# Patient Record
Sex: MALE | Race: WHITE | Employment: UNEMPLOYED | URBAN - METROPOLITAN AREA
[De-identification: names, ages, dates, MRNs, and addresses within clinical notes are randomized per-mention and may not be internally consistent; named-entity substitution may affect disease eponyms.]

---

## 2018-06-18 ENCOUNTER — HOSPITAL ENCOUNTER (OUTPATIENT)
Dept: PHYSICAL THERAPY | Age: 58
End: 2018-06-18

## 2018-07-02 ENCOUNTER — HOSPITAL ENCOUNTER (OUTPATIENT)
Dept: PHYSICAL THERAPY | Age: 58
Discharge: HOME | End: 2018-07-02
Payer: COMMERCIAL

## 2018-07-02 PROCEDURE — 97140 MANUAL THERAPY 1/> REGIONS: CPT

## 2018-07-02 PROCEDURE — 97162 PT EVAL MOD COMPLEX 30 MIN: CPT

## 2018-07-02 NOTE — PROGRESS NOTES
PHYSICAL THERAPY - DAILY TREATMENT NOTE    Patient Name: Luca Kyle        Date: 2018  : 1960   YES Patient  Verified  Visit #:     Insurance: Payor: Kehinde Paniagua / Plan: Tray Kuhn / Product Type: HMO /      In time: 4:00 Out time: 4:50   Total Treatment Time: 50     Medicare Time Tracking (below)   Total Timed Codes (min):  NA 1:1 Treatment Time:  NA     TREATMENT AREA =  Left TKA    SUBJECTIVE    Pain Level (on 0 to 10 scale):  4  / 10   Medication Changes/New allergies or changes in medical history, any new surgeries or procedures? NO    If yes, update Summary List   Subjective Functional Status/Changes:  []  No changes reported     States he had a left TKR \"on or around\" 2018. States he went home after the surgery and had 2 weeks of home health PT. States he has continued with the HEP given to him from the home health PT (standing heel raise, stand march, stair, step ups, LAQ, seated marching)  Lives with his wife in a trilevel home with BR up 1 flight of steps. States he is negotiating steps with 1 HR and nonreciprocal gait pattern. States he uses a SPC ~ 75% of the time. States 25% of the time he is able to walk without an assistive device.   States the pain is intermittent           OBJECTIVE  Physical Therapy Evaluation - Knee        Gait:  [] Normal    [x] Abnormal    [] Antalgic    [] NWB    Device:none    Describe:decreased gait speed, decreased heel strike on left LE, decreased toe off, decreased left knee flexion during swing phase, decreased stance time on left LE   ROM / Strength  [] Unable to assess                  AROM                      PROM                   Strength (1-5)    Left Right Left Right Left Right   Hip Flexion     5 5    Extension     4- 4-    Abduction     5 5    Adduction     4 4   Knee Flexion 115  120  4- 5    Extension 5  2  5 5   Ankle Plantarflexion          Dorsiflexion             Flexibility: [] Unable to assess at this time  Hamstrings:    (L) Tightness= [] WNL   [x] Min   [x] Mod   [] Severe    (R) Tightness= [] WNL   [x] Min   [x] Mod   [] Severe  Quadriceps:    (L) Tightness= [] WNL   [] Min   [] Mod   [] Severe    (R) Tightness= [] WNL   [] Min   [] Mod   [] Severe  Gastroc:      (L) Tightness= [] WNL   [] Min   [] Mod   [] Severe    (R) Tightness= [] WNL   [] Min   [] Mod   [] Severe  Other:    Palpation:   Neg/Pos  Neg/Pos  Neg/Pos   Joint Line  Quad tendon  Patellar ligament    Patella + Fibular head  Pes Anserinus    Tibial tubercle  Hamstring tendons  Infrapatellar fat pad      Optional Tests:  Patellar Positioning (Static)   []L []R Normal []L []R Lateral   []L []R Trena Hoof      []L []R Medial   []L []R [place]    Patellar Tracking   []L []R Glide (Lat)   []L []R Tilt (Lat)     []L []R Glide (Med)  []L []R Tilt (Med)      []L []R Tile (Inf)     Patellar Mobility   []L []R Hypermobile []L [x]R Hypomobile         Girth Measurements:     Cm at  Cm above joint line   Cm at   Cm below joint line  Cm at joint line   Left        Right           Lachmans  [] Neg    [] Pos Posterior Drawer [] Neg    [] Pos  Pivot Shift  [] Neg    [] Pos Posterior Sag  [] Neg    [] Pos  DAYDAY   [] Neg    [] Pos Keri's Test [] Neg    [] Pos  ALRI   [] Neg    [] Pos Squat   [] Neg    [] Pos  Valgus@ 0 Degrees [] Neg    [] Pos Cielo-Gigi [] Neg    [] Pos  Valgus@ 30 Degrees [] Neg    [] Pos Patellar Apprehension [] Neg    [] Pos  Varus@ 0 Degrees [] Neg    [] Pos Leon's Compression [] Neg    [] Pos  Varus@ 30 Degrees [] Neg    [] Pos Ely's Test  [] Neg    [] Pos  Apley's Compression [] Neg    [] Pos Van's Test  [] Neg    [] Pos  Apley's Distraction [] Neg    [] Pos Stroke Test  [] Neg    [] Pos   Anterior Drawer [] Neg    [] Pos Fluctuation Test [] Neg    [] Pos  Other:                  [] Neg    [] Pos                 Other tests/comments:    Modalities Rationale:    decrease edema, decrease inflammation and decrease pain to improve patient's ability to perform ADL's, gait, and functional mobility with improved joint mechanics and decreased pain. min [] Estim, type/location:                                      []  att     []  unatt     []  w/US     []  w/ice    []  w/heat    min []  Mechanical Traction: type/lbs                   []  pro   []  sup   []  int   []  cont    []  before manual    []  after manual    min []  Ultrasound, settings/location:      min []  Iontophoresis w/ dexamethasone, location:                                               []  take home patch       []  in clinic   10 min [x]  Ice     []  Heat    location/position: supine    min []  Vasopneumatic Device, press/temp:     min []  Other:    [x] Skin assessment post-treatment (if applicable):    [x]  intact    []  redness- no adverse reaction     []redness  adverse reaction:      8 min Manual Therapy: Patellar mobs, scar massage, PROM to left knee   Rationale:      decrease pain, increase ROM and increase tissue extensibility to improve patient's ability to perform ADL's, gait, and functional mobility with improved joint mechanics and decreased pain.      min Patient Education:  YES  Reviewed HEP   []  Progressed/Changed HEP based on:   Cont current HEP     Other Objective/Functional Measures:    See eval     Post Treatment Pain Level (on 0 to 10) scale:   4  / 10     ASSESSMENT  Justification for Eval Code Complexity:  Patient History (low 0, mod 1-2, high 3-4): high (arthritis, back pain, DM)  Examination (low 1-2, mod 3+, high 4+): mod (see above)  Clinical Presentation (low stable or uncomplicated, mod evolving or changing, high unstable or unpredictable): mod  Clinical Decision Making (low , mod 26-74, high 1-25): FOTO = 32/100 mod   []  See Progress Note/Recertification   Patient will continue to benefit from skilled PT services to modify and progress therapeutic interventions, address functional mobility deficits, address ROM deficits, address strength deficits, analyze and address soft tissue restrictions, analyze and cue movement patterns, analyze and modify body mechanics/ergonomics, assess and modify postural abnormalities and instruct in home and community integration to attain remaining goals. Progress toward goals / Updated goals:    Goals established. PLAN    [x]  Upgrade activities as tolerated YES Continue plan of care   []  Discharge due to :    []  Other:      Therapist: Jessica Durant PT    Date: 7/2/2018 Time: 4:05 PM     No future appointments.

## 2018-07-02 NOTE — PROGRESS NOTES
[address] [facility] PHYSICAL THERAPY  [address] [facility] Ernestine Santana, [address] - Phone: [phone]  Fax: [phone] / [address] [facility]  Patient Name: Nely Santoyo : 1960   Medical   Diagnosis: Left knee pain [M25.562] Treatment Diagnosis: Left TKA   Onset Date: 2018     Referral Source: Romelia Macdonald [place] [facility]): 2018   Prior Hospitalization: See medical history Provider #: [id number]   Prior Level of Function: Chronic left knee pain, independent    Comorbidities: Arthritis, DM, back pain   Medications: Verified on Patient Summary List   The Plan of Care and following information is based on the information from the initial evaluation.   ===========================================================================================  Assessment / key information:  Patient is a 59y.o. year old male who presents to [facility] s/p left TKR with DOS 2018. Patient reports he has had 2 weeks of home health physical therapy. Patient is currenty ambulating with a SPC intermittently. He presents to PT today without an assistive device. ROM and strength measurements are documented below. Medial/lateral mobility is WNL; inferior/superior patellar mobility is mildly decreased in the left LE. Patient with a Functional Status score of 32/100 on FOTO (Focused on Therapeutic Outcomes), which corresponds to a functional limitation of 68%. Patient with gait deviations as follows: decreased gait speed, decreased heel strike on left LE, decreased toe off, decreased left knee flexion during swing phase, decreased stance time on left LE. Patient will benefit from skilled physical therapy services to address these issues.     ROM / Strength  [] Unable to assess                  AROM                      PROM                   Strength (1-5)      Left Right Left Right Left Right   Hip Flexion         5 5     Extension         4- 4-     Abduction         5 5     Adduction         4 4   Knee Flexion 115   120   4- 5     Extension 5   2   5 5       ===========================================================================================  Eval Complexity: History: HIGH Complexity :3+ comorbidities / personal factors will impact the outcome/ POC Exam:MEDIUM Complexity : 3 Standardized tests and measures addressing body structure, function, activity limitation and / or participation in recreation  Presentation: MEDIUM Complexity : Evolving with changing characteristics  Clinical Decision Making:MEDIUM Complexity : FOTO score of 26-74Overall Complexity:MEDIUM    Problem List: pain affecting function, decrease ROM, decrease strength, edema affecting function, impaired gait/ balance, decrease ADL/ functional abilitiies, decrease activity tolerance, decrease flexibility/ joint mobility and decrease transfer abilities   Treatment Plan may include any combination of the following: Therapeutic exercise, Therapeutic activities, Neuromuscular re-education, Physical agent/modality, Gait/balance training, Manual therapy, Aquatic therapy and Patient education  Patient / Family readiness to learn indicated by: asking questions, trying to perform skills and interest  Persons(s) to be included in education: patient (P)  Barriers to Learning/Limitations: None  Measures taken:    Patient Goal (s): \"healing\"   Patient self reported health status: fair  Rehabilitation Potential: good   Short Term Goals: To be accomplished in  2  weeks:  1) Patient will be compliant with HEP.  Long Term Goals: To be accomplished in  4  weeks:  1) Patient to be independent & compliant with HEP in preparation for D/C.  2) Patient will improve left knee AROM flexion to >120 degrees to increase ease with ADL's.    3) Improve strength of left knee flexion to 5/5 with no c/o pain to increase dynamic stability with gait.   4) Patient will improve left knee AROM extension to < 0 degrees to facilitate normal gait. 5)  Patient will increase FOTO Functional Status score to 54/100 to indicate decreased functional limitations. Frequency / Duration:   Patient to be seen  2-3  times per week for 4  weeks:  Patient / Caregiver education and instruction: self care  G-Codes (GP): VADIM  Therapist Signature: Cori Keita PT Date: 6/7/6074   Certification Period: NA Time: 5:04 PM   ===========================================================================================  I certify that the above Physical Therapy Services are being furnished while the patient is under my care. I agree with the treatment plan and certify that this therapy is necessary. Physician Signature:        Date:       Time:     Please sign and return to [facility] or you may fax the signed copy to [phone]. Thank you.

## 2018-07-06 ENCOUNTER — HOSPITAL ENCOUNTER (OUTPATIENT)
Dept: PHYSICAL THERAPY | Age: 58
Discharge: HOME | End: 2018-07-06
Payer: COMMERCIAL

## 2018-07-06 PROCEDURE — 97140 MANUAL THERAPY 1/> REGIONS: CPT

## 2018-07-06 PROCEDURE — 97110 THERAPEUTIC EXERCISES: CPT

## 2018-07-06 NOTE — PROGRESS NOTES
PHYSICAL THERAPY - DAILY TREATMENT NOTE    Patient Name: Heraclio Gomes        Date: 2018  : 1960   YES Patient  Verified  Visit #:   2   of   12  Insurance: Payor: Andrea Richter / Plan: Valri Pulling / Product Type: HMO /      In time: 8:00 Out time: 8:53   Total Treatment Time: 53     Medicare/[facility] Time Tracking (below)   Total Timed Codes (min):  48 1:1 Treatment Time:  48     TREATMENT AREA =  Left TKA    SUBJECTIVE    Pain Level (on 0 to 10 scale):  3  / 10   Medication Changes/New allergies or changes in medical history, any new surgeries or procedures? NO    If yes, update Summary List   Subjective Functional Status/Changes:  []  No changes reported     Patient reports he has been compliant with his HEP but hasnt seen much improvement. OBJECTIVE    Modalities Rationale:    decrease edema, decrease inflammation and decrease pain to improve patient's ability to perform perform ADL's and age appropriate recreational activities. min [] Estim, type/location:                                      []  att     []  unatt     []  w/US     []  w/ice    []  w/heat    min []  Mechanical Traction: type/lbs                   []  pro   []  sup   []  int   []  cont    []  before manual    []  after manual    min []  Ultrasound, settings/location:      min []  Iontophoresis w/ dexamethasone, location:                                               []  take home patch-6hour remove at        []  in clinic   5 min [x]  Ice     []  Heat    location/position: Left Knee    min []  Vasopneumatic Device, press/temp:     min []  Other:    [x] Skin assessment post-treatment (if applicable):    [x]  intact    []  redness- no adverse reaction     []redness  adverse reaction:      40 min Therapeutic Exercise:  [x]  See flow sheet   Rationale:      increase ROM and increase strength to improve the patients ability to negotiate various environments in a safe and effective manner.     Rationale:     8 min Manual Therapy: Patellar Mobs in all directions in Grades 1-2  Scar Massage   Rationale:      decrease pain, increase ROM and increase tissue extensibility to improve patient's pain-free mobility     min Patient Education:  YES  Reviewed HEP and education regaridng scar massage provided   []  Progressed/Changed HEP based on: Other Objective/Functional Measures:    Increased Temperature, Rubor and Swelling in Left knee  Grade 1-2 Patellar Mobs not tolerated well. Decreased inf/sup movement       Post Treatment Pain Level (on 0 to 10) scale:   7  / 10     ASSESSMENT    Assessment/Changes in Function:     Initiated POC. Today manual treatment was not tolerated well. Inferior and superior patellar movement was very limited. []  See Progress Note/Recertification   Patient will continue to benefit from skilled PT services to analyze,, cue,, progress,, modify,, demonstrate,, instruct, and address, movement patterns,, therapeutic interventions,, postural abnormalities,, soft tissue restrictions,, ROM,, strength,, functional mobility,, body mechanics/ergonomics, and home and community integration, to attain remaining goals.    Progress toward goals / Updated goals:    Initiated POC     PLAN    []  Upgrade activities as tolerated YES Continue plan of care   []  Discharge due to :    []  Other:      Therapist: Andrea Saldivar DPT    Date: 7/6/2018 Time: 7:31 AM   Future Appointments  Date Time Provider Theron Lam   7/6/2018 8:00 AM [facility] [facility]   7/9/2018 2:30 PM [facility] [facility]   7/11/2018 11:00 AM [facility]   7/16/2018 3:00 PM [facility]   7/18/2018 11:00 AM [facility]   7/25/2018 11:00 AM [facility]   7/27/2018 9:00 AM [facility]   7/30/2018 10:00 AM [facility]

## 2018-07-09 ENCOUNTER — HOSPITAL ENCOUNTER (OUTPATIENT)
Dept: PHYSICAL THERAPY | Age: 58
Discharge: HOME | End: 2018-07-09
Payer: COMMERCIAL

## 2018-07-09 PROCEDURE — 97016 VASOPNEUMATIC DEVICE THERAPY: CPT

## 2018-07-09 PROCEDURE — 97110 THERAPEUTIC EXERCISES: CPT

## 2018-07-09 PROCEDURE — 97140 MANUAL THERAPY 1/> REGIONS: CPT

## 2018-07-09 NOTE — PROGRESS NOTES
PHYSICAL THERAPY - DAILY TREATMENT NOTE    Patient Name: Kelsi Wray        Date: 2018  : 1960   YES Patient  Verified  Visit #:   3   of   12  Insurance: Payor: Darryle Blander / Plan: Sharee Agarwal / Product Type: HMO /      In time: 2:51 Out time: 3:47   Total Treatment Time: 56     Medicare/[employer] Time Tracking (below)   Total Timed Codes (min):  56 1:1 Treatment Time:  56     TREATMENT AREA =  Left TKA    SUBJECTIVE    Pain Level (on 0 to 10 scale):  0  / 10   Medication Changes/New allergies or changes in medical history, any new surgeries or procedures? NO    If yes, update Summary List   Subjective Functional Status/Changes:  []  No changes reported     Patient reports to clinic 21 minutes late but no pain today. He states he took some advil this morning but his back pain has been increased. Doesn't know exactly what caused the back pain. OBJECTIVE    Modalities Rationale:    decrease inflammation and decrease pain to improve patient's ability to perform perform ADL's and age appropriate recreational activities.    min [] Estim, type/location:                                      []  att     []  unatt     []  w/US     []  w/ice    []  w/heat    min []  Mechanical Traction: type/lbs                   []  pro   []  sup   []  int   []  cont    []  before manual    []  after manual    min []  Ultrasound, settings/location:      min []  Iontophoresis w/ dexamethasone, location:                                               []  take home patch-6hour remove at        []  in clinic    min []  Ice     []  Heat    location/position:    10 min [x]  Vasopneumatic Device, press/temp: Left Knee    min []  Other:    [x] Skin assessment post-treatment (if applicable):    [x]  intact    []  redness- no adverse reaction     []redness  adverse reaction:      38 min Therapeutic Exercise:  [x]  See flow sheet   Rationale:      increase ROM and increase strength to improve the patients ability to negotiate various environments in a safe and effective manner. 8 min Manual Therapy: Scar Massage, Grade 2-3 patella mobs in all directions   Rationale:      increase ROM and increase tissue extensibility to improve patient's pain-free mobility     min Patient Education:  YES  Reviewed HEP   []  Progressed/Changed HEP based on: Other Objective/Functional Measures:  21 minutes late  Observation: wearing two different types of shoes to accomodate LE length discrepancy. Reports improved symptoms management. Increased pain with weightbearing with hip 3 way. Post Treatment Pain Level (on 0 to 10) scale:   2  / 10     ASSESSMENT    Assessment/Changes in Function:     Patient tolerated treatment session well and is progressing well towards goals. Patient experienced 10/10 pain when trying to transfer from supine to right sidelying after straight leg raises. After modalities, patient's pain was 2/10 and he was able to walk out of clinic without any issues. []  See Progress Note/Recertification   Patient will continue to benefit from skilled PT services to analyze,, cue,, progress,, modify,, demonstrate,, instruct, and address, movement patterns,, therapeutic interventions,, postural abnormalities,, soft tissue restrictions,, ROM,, strength,, functional mobility,, body mechanics/ergonomics, and home and community integration, to attain remaining goals. Progress toward goals / Updated goals: · Short Term Goals: To be accomplished in  2  weeks:  1) Patient will be compliant with HEP.    · Long Term Goals: To be accomplished in  4  weeks:  1) Patient to be independent & compliant with HEP in preparation for D/C.  2) Patient will improve left knee AROM flexion to >120 degrees to increase ease with ADL's.    3) Improve strength of left knee flexion to 5/5 with no c/o pain to increase dynamic stability with gait.   4) Patient will improve left knee AROM extension to < 0 degrees to facilitate normal gait.  5)  Patient will increase FOTO Functional Status score to 54/100 to indicate decreased functional limitations.     Progression towards goals limited by tardiness to session     PLAN    []  Upgrade activities as tolerated YES Continue plan of care   []  Discharge due to :    []  Other:      Therapist: Evelyn Styles DPT    Date: 7/9/2018 Time: 2:26 PM   Future Appointments  Date Time Provider Theron Lam   7/9/2018 2:30 PM [facility] [facility] [address]   7/11/2018 11:00 AM [facility]   7/16/2018 3:00 PM [facility] [facility] [address]   7/18/2018 11:00 AM [facility]   7/25/2018 11:00 AM [facility]   7/27/2018 9:00 AM [facility]   7/30/2018 10:00 AM [facility]

## 2018-07-11 ENCOUNTER — HOSPITAL ENCOUNTER (OUTPATIENT)
Dept: PHYSICAL THERAPY | Age: 58
Discharge: HOME | End: 2018-07-11
Payer: COMMERCIAL

## 2018-07-11 PROCEDURE — 97110 THERAPEUTIC EXERCISES: CPT

## 2018-07-11 PROCEDURE — 97140 MANUAL THERAPY 1/> REGIONS: CPT
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
## 2018-07-16 ENCOUNTER — HOSPITAL ENCOUNTER (OUTPATIENT)
Dept: PHYSICAL THERAPY | Age: 58
End: 2018-07-16
Payer: COMMERCIAL

## 2018-07-18 ENCOUNTER — HOSPITAL ENCOUNTER (OUTPATIENT)
Dept: PHYSICAL THERAPY | Age: 58
Discharge: HOME | End: 2018-07-18
Payer: COMMERCIAL

## 2018-07-18 PROCEDURE — 97140 MANUAL THERAPY 1/> REGIONS: CPT

## 2018-07-18 PROCEDURE — 97110 THERAPEUTIC EXERCISES: CPT

## 2018-07-18 NOTE — PROGRESS NOTES
PHYSICAL THERAPY - DAILY TREATMENT NOTE    Patient Name: Jeannette Brandon        Date: 2018  : 1960   YES Patient  Verified  Visit #:     Insurance: Payor: Claudeen Lao / Plan: Klaudia Sanderson / Product Type: HMO /      In time: 11:07 Out time: 12:00   Total Treatment Time: 53     Medicare/BCBS [place] Time Tracking (below)   Total Timed Codes (min):  NA 1:1 Treatment Time:  NA     TREATMENT AREA =  Left TKA    SUBJECTIVE    Pain Level (on 0 to 10 scale):  3  / 10   Medication Changes/New allergies or changes in medical history, any new surgeries or procedures? NO    If yes, update Summary List   Subjective Functional Status/Changes:  []  No changes reported     \"It is getting a little better. I just feel like I should be running around by now. \"     Patient reported at end of therapy that sometimes he has increased pain after therapy and has to sit down for the rest of the day. OBJECTIVE    Modalities Rationale:  decrease edema, decrease inflammation and decrease pain to improve patient's ability to perform ADL's, gait, and functional mobility with improved joint mechanics and decreased pain.      min [] Estim, type/location:                                      []  att     []  unatt     []  w/US     []  w/ice    []  w/heat    min []  Mechanical Traction: type/lbs                   []  pro   []  sup   []  int   []  cont    []  before manual    []  after manual    min []  Ultrasound, settings/location:      min []  Iontophoresis w/ dexamethasone, location:                                               []  take home patch       []  in clinic   10 min [x]  Ice     []  Heat    location/position: supine    min []  Vasopneumatic Device, press/temp:     min []  Other:    [x] Skin assessment post-treatment (if applicable):    [x]  intact    []  redness- no adverse reaction     []redness  adverse reaction:      35 min Therapeutic Exercise:  [x]  See flow sheet   Rationale:      increase ROM and increase strength to improve the patients ability to perform ADL's, gait, and functional mobility with improved joint mechanics and decreased pain. 8 min Manual Therapy: Scar massage, patellar mobs, PROM for left knee flexion/extension   Rationale:      decrease pain, increase ROM and increase tissue extensibility to improve patient's ability to perform ADL's, gait, and functional mobility with improved joint mechanics and decreased pain. min Patient Education:  YES  Reviewed HEP   []  Progressed/Changed HEP based on:   Cont HEP     Other Objective/Functional Measures:    Left knee AROM: 2-115; PROM flexion to 120 deg. Patient reported back pain with minisquats. Educated patient on proper body mechanics and patient performed hip hinge (sit <> stand). He then performed minisquats with hip hinge technique and reported no back pain. Post Treatment Pain Level (on 0 to 10) scale:   6  / 10     ASSESSMENT    Assessment/Changes in Function:     Patient performed exercises with minimal c/o's pain; however, after cold pack, patient reported increased pain in left knee. []  See Progress Note/Recertification   Patient will continue to benefit from skilled PT services to modify and progress therapeutic interventions, address functional mobility deficits, address ROM deficits, address strength deficits, analyze and address soft tissue restrictions, analyze and cue movement patterns, analyze and modify body mechanics/ergonomics, assess and modify postural abnormalities and instruct in home and community integration to attain remaining goals. Progress toward goals / Updated goals: · Short Term Goals: To be accomplished in  2  weeks:  1) Patient will be compliant with HEP. Reports compliance with HEP.      PLAN    [x]  Upgrade activities as tolerated YES Continue plan of care   []  Discharge due to :    []  Other:      Therapist: Irvin Mai PT    Date: 7/18/2018 Time: 11:25 AM     Future Appointments  Date Time Provider Theron Carole   7/25/2018 11:00 AM Meg Tilley, PT [facility]   7/27/2018 9:00 AM Meg Tilley, PT [facility]   7/30/2018 10:00 AM Meg Tilley, PT [facility]

## 2018-07-25 ENCOUNTER — HOSPITAL ENCOUNTER (OUTPATIENT)
Dept: PHYSICAL THERAPY | Age: 58
Discharge: HOME | End: 2018-07-25
Payer: COMMERCIAL

## 2018-07-25 PROCEDURE — 97140 MANUAL THERAPY 1/> REGIONS: CPT

## 2018-07-25 NOTE — PROGRESS NOTES
PHYSICAL THERAPY - DAILY TREATMENT NOTE    Patient Name: Thomas Blevins        Date: 2018  : 1960   YES Patient  Verified  Visit #:     Insurance: Payor: Tanner Loomis / Plan: Satinder Mendoza / Product Type: HMO /      In time: 11:08 Out time: 11:58   Total Treatment Time: 50     Medicare/BCBS [place] Time Tracking (below)   Total Timed Codes (min):  40 1:1 Treatment Time:  12     TREATMENT AREA =  Left TKA    SUBJECTIVE    Pain Level (on 0 to 10 scale):  3-4  / 10   Medication Changes/New allergies or changes in medical history, any new surgeries or procedures? NO    If yes, update Summary List   Subjective Functional Status/Changes:  []  No changes reported     \"My knee hurt for 2-3 days after the last PT session. It felt better on  and then I went and walked around the [employer] for 45 minutes on Monday. That made my knee hurt again. I don't know why but it hurts so bad. \"  Reports he on takes Tramadol \"sometimes\" before bed. Patient reports compliance with previous HEP of LAQ, standing heel raise, stand march, knee flexion stretch at step, and marching. OBJECTIVE    Modalities Rationale:  decrease edema, decrease inflammation and decrease pain to improve patient's ability to perform ADL's, gait, and functional mobility with improved joint mechanics and decreased pain.      min [] Estim, type/location:                                      []  att     []  unatt     []  w/US     []  w/ice    []  w/heat    min []  Mechanical Traction: type/lbs                   []  pro   []  sup   []  int   []  cont    []  before manual    []  after manual    min []  Ultrasound, settings/location:      min []  Iontophoresis w/ dexamethasone, location:                                               []  take home patch       []  in clinic   10 min [x]  Ice     []  Heat    location/position: Long sitting    min []  Vasopneumatic Device, press/temp:     min []  Other:    [x] Skin assessment post-treatment (if applicable):    [x]  intact    []  redness- no adverse reaction     []redness  adverse reaction:      28 (0) min Therapeutic Exercise:  [x]  See flow sheet   Rationale:      increase ROM and increase strength to improve the patients ability to perform ADL's, gait, and functional mobility with improved joint mechanics and decreased pain. 12 min Manual Therapy: Scar massage, patellar mobs (inf/sup/med/lat), PROM to knee flex/ext. Rationale:      decrease pain, increase ROM and increase tissue extensibility to improve patient's ability to perform ADL's, gait, and functional mobility with improved joint mechanics and decreased pain. min Patient Education:  YES  Reviewed HEP   []  Progressed/Changed HEP based on: Other Objective/Functional Measures:    Discussed using OTC pain med to help control pain. Patient stated that he used to take Advil \"all the time\" but he has not tried it since the surgery. Reported he would try taking Advil to see if it helps pain. No discoloration or signs of infection in the left knee noted. Patient denies tenderness with palpation over the left ITB. Held more strenuous exercises today due to history of increased pain for days after therapy. Left knee AROM: 2 - 118 degrees. Post Treatment Pain Level (on 0 to 10) scale:   2  / 10     ASSESSMENT    Assessment/Changes in Function:     Fair tolerance with exercises. []  See Progress Note/Recertification   Patient will continue to benefit from skilled PT services to modify and progress therapeutic interventions, address functional mobility deficits, address ROM deficits, address strength deficits, analyze and address soft tissue restrictions, analyze and cue movement patterns, analyze and modify body mechanics/ergonomics, assess and modify postural abnormalities and instruct in home and community integration to attain remaining goals.      Progress toward goals / Updated goals:    · Long Term Goals: To be accomplished in  4  weeks:  1) Patient to be independent & compliant with HEP in preparation for D/C. Compliant with HEP  2) Patient will improve left knee AROM flexion to >120 degrees to increase ease with ADL's. Left knee flexion AROM to 118 degrees. 3) Improve strength of left knee flexion to 5/5 with no c/o pain to increase dynamic stability with gait. 4) Patient will improve left knee AROM extension to < 0 degrees to facilitate normal gait. Left knee AROM ext to 2 degrees. 5)  Patient will increase FOTO Functional Status score to 54/100 to indicate decreased functional limitations.      PLAN    [x]  Upgrade activities as tolerated YES Continue plan of care   []  Discharge due to :    []  Other:      Therapist: Mark Evans PT    Date: 7/25/2018 Time: 8:03 AM     Future Appointments  Date Time Provider Theron Lam   7/25/2018 11:00 AM Kirt Gunn PT [facility]   7/27/2018 9:00 AM Mark Evans PT [facility]   7/30/2018 10:00 AM Mark Evans PT [facility]   8/1/2018 8:00 AM Mark Evans PT [facility]   8/6/2018 5:00 PM Mark Evans PT [facility]   8/8/2018 1:00 PM Mark Evans PT [facility]   8/13/2018 3:00 PM Mark Evans PT [facility]   8/17/2018 1:00 PM Mark Evans PT [facility]   8/24/2018 11:00 AM Mark Evans PT [facility]   8/27/2018 5:00 PM Mark Evans PT [facility]   8/29/2018 1:00 PM Mark Evans PT [facility]

## 2018-07-27 ENCOUNTER — HOSPITAL ENCOUNTER (OUTPATIENT)
Dept: PHYSICAL THERAPY | Age: 58
Discharge: HOME | End: 2018-07-27
Payer: COMMERCIAL

## 2018-07-27 PROCEDURE — 97110 THERAPEUTIC EXERCISES: CPT

## 2018-07-27 PROCEDURE — 97140 MANUAL THERAPY 1/> REGIONS: CPT

## 2018-07-27 PROCEDURE — 97016 VASOPNEUMATIC DEVICE THERAPY: CPT

## 2018-07-27 NOTE — PROGRESS NOTES
PHYSICAL THERAPY - DAILY TREATMENT NOTE Patient Name: Sarika Hays        Date: 2018 : 1960   YES Patient  Verified Visit #:     Insurance: Payor: Terrence Romero / Plan: Isabella Coles / Product Type: HMO /   
 
In time: 584 Out time:  Total Treatment Time: 60 Medicare/[place] or [place] Time Tracking (below) Total Timed Codes (min):  60 1:1 Treatment Time:  60 TREATMENT AREA =  LEFT TKA SUBJECTIVE Pain Level (on 0 to 10 scale):   / 10 Medication Changes/New allergies or changes in medical history, any new surgeries or procedures? NO    If yes, update Summary List  
Subjective Functional Status/Changes:  []  No changes reported Pt reports that his knees are feeling much better today. OBJECTIVE Modalities Rationale:    decrease edema, decrease inflammation and decrease pain to improve patient's ability to perform perform ADL's and age appropriate recreational activities. min [] Estim, type/location:    
                                 []  att     []  unatt     []  w/US     []  w/ice    []  w/heat  
 min []  Mechanical Traction: type/lbs   
               []  pro   []  sup   []  int   []  cont    []  before manual    []  after manual  
 min []  Ultrasound, settings/location:    
 min []  Iontophoresis w/ dexamethasone, location:   
                                           []  take home patch-6hour remove at        []  in clinic  
 min []  Ice     []  Heat    location/position:   
10 min [x]  Vasopneumatic Device, press/temp: LEft Knee  
 min []  Other:   
[x] Skin assessment post-treatment (if applicable):   
[x]  intact    []  redness- no adverse reaction    
[]redness  adverse reaction:   
 
38 min Therapeutic Exercise:  [x]  See flow sheet Rationale:      increase ROM and increase strength to improve the patients ability to negotiate various environments in a safe and effective manner.  
 
12 min Manual Therapy: Scar massage, patellar mobs (inf/sup/med/lat), PROM to knee flex/ext. Rationale:      decrease pain, increase ROM and increase tissue extensibility to improve patient's pain-free mobility 
 
 min Patient Education:  YES  Reviewed HEP []  Progressed/Changed HEP based on: Other Objective/Functional Measures: 
 
Quad Sets performed with manual knee extension stretch. Post Treatment Pain Level (on 0 to 10) scale:   0  / 10 ASSESSMENT Assessment/Changes in Function:  
 
Patient tolerated treatment session well and is progressing well towards goals. Fidel was able to perform full treatment session today without increases in knee pain. []  See Progress Note/Recertification Patient will continue to benefit from skilled PT services to analyze,, cue,, progress,, modify,, demonstrate,, instruct, and address, movement patterns,, therapeutic interventions,, postural abnormalities,, soft tissue restrictions,, ROM,, strength,, functional mobility,, body mechanics/ergonomics, and home and community integration, to attain remaining goals. Progress toward goals / Updated goals: · Short Term Goals: To be accomplished in  2  weeks: 
1) Patient will be compliant with HEP. MET  
· Long Term Goals: To be accomplished in  4  weeks: 
1) Patient to be independent & compliant with HEP in preparation for D/C. 2) Patient will improve left knee AROM flexion to >120 degrees to increase ease with ADL's. PROM 0-124 
3) Improve strength of left knee flexion to 5/5 with no c/o pain to increase dynamic stability with gait. 4) Patient will improve left knee AROM extension to < 0 degrees to facilitate normal gait. 5)  Patient will increase FOTO Functional Status score to 54/100 to indicate decreased functional limitations. PLAN 
 
[]  Upgrade activities as tolerated YES Continue plan of care  
[]  Discharge due to :   
[]  Other:   
 
Therapist: Néstor King DPT Date: 7/27/2018 Time: 9:44 AM  
Future Appointments Date Time Provider Theron Lam 7/27/2018 10:00 AM [address] [facility] [address] [facility]  
7/30/2018 10:00 AM Raad Kumar, PT [facility]  
8/1/2018 8:00 AM Raad Kumar, PT [facility]  
8/6/2018 5:00 PM Raad Kumar, PT [facility]  
8/8/2018 1:00 PM Raad Kumar, PT [facility]  
8/13/2018 3:00 PM Raad Kumar, PT [facility]  
8/17/2018 1:00 PM Raad Kumar, PT [facility]  
8/24/2018 11:00 AM Raad Kumar, PT [facility]  
8/27/2018 5:00 PM Raad Kumar, PT [facility]  
8/29/2018 1:00 PM Raad Kumar, PT [facility]

## 2018-07-30 ENCOUNTER — HOSPITAL ENCOUNTER (OUTPATIENT)
Dept: PHYSICAL THERAPY | Age: 58
Discharge: HOME | End: 2018-07-30
Payer: COMMERCIAL

## 2018-07-30 PROCEDURE — 97016 VASOPNEUMATIC DEVICE THERAPY: CPT

## 2018-07-30 PROCEDURE — 97110 THERAPEUTIC EXERCISES: CPT

## 2018-07-30 PROCEDURE — 97140 MANUAL THERAPY 1/> REGIONS: CPT

## 2018-07-30 NOTE — PROGRESS NOTES
PHYSICAL THERAPY - DAILY TREATMENT NOTE Patient Name: Manasa Carrion        Date: 2018 : 1960   YES Patient  Verified Visit #:     Insurance: Payor: Miguel Herrera / Plan: Chidi Pérez / Product Type: HMO /   
 
In time: 10:05 Out time: 10:55 Total Treatment Time: 50 Medicare/BCBS [place] Time Tracking (below) Total Timed Codes (min):  40 1:1 Treatment Time:  25 TREATMENT AREA =  Left TKA SUBJECTIVE Pain Level (on 0 to 10 scale):  0.5  / 10 Medication Changes/New allergies or changes in medical history, any new surgeries or procedures? NO    If yes, update Summary List  
Subjective Functional Status/Changes:  []  No changes reported \"My knee is a little sore today. Not as bad as it was last week though. \" OBJECTIVE Modalities Rationale:  decrease edema, decrease inflammation and decrease pain to improve patient's ability to perform ADL's, gait, and functional mobility with improved joint mechanics and decreased pain. min [] Estim, type/location:    
                                 []  att     []  unatt     []  w/US     []  w/ice    []  w/heat  
 min []  Mechanical Traction: type/lbs   
               []  pro   []  sup   []  int   []  cont    []  before manual    []  after manual  
 min []  Ultrasound, settings/location:    
 min []  Iontophoresis w/ dexamethasone, location:   
                                           []  take home patch       []  in clinic  
 min []  Ice     []  Heat    location/position:   
10 min [x]  Vasopneumatic Device, press/temp: Game Ready to left knee with left LE elevated on wedge  
 min []  Other:   
[x] Skin assessment post-treatment (if applicable):   
[x]  intact    []  redness- no adverse reaction    
[]redness  adverse reaction:   
 
30 (15) min Therapeutic Exercise:  [x]  See flow sheet Rationale:      increase ROM, increase strength, improve coordination, improve balance and increase proprioception to improve the patients ability to perform ADL's, gait, and functional mobility with improved joint mobility and decreased pain. 10 (10) min Manual Therapy: Scar massage, patellar mobs (inf/sup/med/lat), PROM to knee flex/ext. Rationale:      decrease pain, increase ROM and increase tissue extensibility to improve patient's ability to perform ADL's, gait, and functional mobility with improved joint mechanics and decreased pain. min Patient Education:  YES  Reviewed HEP []  Progressed/Changed HEP based on: Other Objective/Functional Measures: 
 
Left knee PROM: 0 - 120 degrees. Added bridge Post Treatment Pain Level (on 0 to 10) scale:   3  / 10 ASSESSMENT Assessment/Changes in Function:  
 
Reports mildly increased \"soreness\" in left knee with PROM and minisquats. []  See Progress Note/Recertification Patient will continue to benefit from skilled PT services to modify and progress therapeutic interventions, address functional mobility deficits, address ROM deficits, address strength deficits, analyze and address soft tissue restrictions, analyze and cue movement patterns, analyze and modify body mechanics/ergonomics, assess and modify postural abnormalities and instruct in home and community integration to attain remaining goals. Progress toward goals / Updated goals: · Short Term Goals: To be accomplished in  2  weeks: 
1) Patient will be compliant with HEP.  MET  
· Long Term Goals: To be accomplished in  4  weeks: 
1) Patient to be independent & compliant with HEP in preparation for D/C. 2) Patient will improve left knee AROM flexion to >120 degrees to increase ease with ADL's.  left knee flexion PROM to 120 deg 3) Improve strength of left knee flexion to 5/5 with no c/o pain to increase dynamic stability with gait. 4) Patient will improve left knee AROM extension to < 0 degrees to facilitate normal gait. Left knee PROM extension to 0 degrees.  
5)  Patient will increase FOTO Functional Status score to 54/100 to indicate decreased functional limitations. PLAN [x]  Upgrade activities as tolerated YES Continue plan of care  
[]  Discharge due to :   
[]  Other:   
 
Therapist: Helio Wells PT Date: 7/30/2018 Time: 8:25 AM  
 
Future Appointments Date Time Provider Theron Lam 7/30/2018 10:00 AM Helio Wells PT [facility]  
8/1/2018 8:00 AM Helio Wells PT [facility]  
8/6/2018 5:00 PM Helio Wells PT [facility]  
8/8/2018 1:00 PM Helio Wells PT [facility]  
8/13/2018 3:00 PM Helio Wells PT [facility]  
8/17/2018 1:00 PM Helio Wells PT [facility]  
8/24/2018 11:00 AM Helio Wells PT [facility]  
8/27/2018 5:00 PM Helio Wells PT [facility]  
8/29/2018 1:00 PM Helio Wells PT [facility]

## 2018-08-01 ENCOUNTER — HOSPITAL ENCOUNTER (OUTPATIENT)
Dept: PHYSICAL THERAPY | Age: 58
Discharge: HOME | End: 2018-08-01
Payer: COMMERCIAL

## 2018-08-01 PROCEDURE — 97110 THERAPEUTIC EXERCISES: CPT

## 2018-08-01 PROCEDURE — 97140 MANUAL THERAPY 1/> REGIONS: CPT

## 2018-08-01 NOTE — PROGRESS NOTES
[address] PHYSICAL THERAPY  [address] [place] [address] [address], [address] - Phone: [phone]  Fax: [phone]  PROGRESS NOTE  Patient Name: Dori Hernandez : 1960   Treatment/Medical Diagnosis: Left knee pain [M25.562]   Referral Source: [place]     Date of Initial Visit: 2018 Attended Visits: 9 Missed Visits: 1     SUMMARY OF TREATMENT  Treatment has consisted of active warm up on stationary bike, ROM exercises, LE strengthening exercises (gentle), cold packs and Game Ready (vasopheumatic device) to decrease edema and pain, patient education, and home exercise program.   CURRENT STATUS  Patient is progressing with physical therapy. Exercises were initially limited due to pain in left knee; however, he has reported improved pain control since he has started taking Advil. Left knee AROM is 3-120 degrees; PROM is 0 -125 degrees. His FOTO (Focused on Therapeutic Outcomes) Functional Status score improved by 2 points since initial eval, indicating mildly improved tolerance with functional activities. Strength (1-5)  Hip Left Right   Flexion 5 5   Extension 4 4   Abduction 5 5   Adduction 4 4   ER       IR       Knee Left Right   Extension 5 5   Flexion 4 5         Goal/Measure of Progress Goal Met? 1. Patient to be independent & compliant with HEP in preparation for D/C. Status at last Eval: NA Current Status: Compliant with HEP yes   2. Patient will improve left knee AROM flexion to >120 degrees to increase ease with ADL's. Status at last Eval: 115 deg Current Status: 120 degrees yes   3. Improve strength of left knee flexion to 5/5 with no c/o pain to increase dynamic stability with gait. Status at last Eval: 4-/5 Current Status: 4/5 progressing   4. Patient will improve left knee AROM extension to < 0 degrees to facilitate normal gait. Status at last Eval: 5 deg Current Status: 3 degrees progressing     5.   Patient will increase FOTO Functional Status score to 54/100 to indicate decreased functional limitations. Status at last Eval: 32/100 Current Status: 34/100 progressing       New Goals to be achieved in __3-4__  weeks:  1. Patient will be independent with HEP. 2.  Improve strength of left knee flexion to 5/5 with no c/o pain to increase dynamic stability with gait. 3.  Patient will improve left knee AROM extension to < 0 degrees to facilitate normal gait. 4.  Patient will increase FOTO Functional Status score to 54/100 to indicate decreased functional limitations. G-Codes: NA  RECOMMENDATIONS  Continue PT 2-3x/week for 3-4 more weeks to further improve left knee AROM and strength to improve tolerance with ADL's, gait, and functional mobility. If you have any questions/comments please contact us directly at [phone]. Thank you for allowing us to assist in the care of your patient. Therapist Signature: Aditi Forte PT Date: 8/1/2018     Time: 7:55 AM   NOTE TO PHYSICIAN:  PLEASE COMPLETE THE ORDERS BELOW AND FAX TO   [facility] Physical Therapy: [phone]. If you are unable to process this request in 24 hours please contact our office: [phone].    ___ I have read the above report and request that my patient continue as recommended.   ___ I have read the above report and request that my patient continue therapy with the following changes/special instructions:_________________________________________________________   ___ I have read the above report and request that my patient be discharged from therapy.      Physician Signature:        Date:       Time:

## 2018-08-01 NOTE — PROGRESS NOTES
PHYSICAL THERAPY - DAILY TREATMENT NOTE    Patient Name: Jodie Lorenz        Date: 2018  : 1960   YES Patient  Verified  Visit #:     Insurance: Payor: Barber Gill / Plan: Gerson Nab / Product Type: HMO /      In time: 11:30 Out time: 12:10   Total Treatment Time: 40     Medicare/BCBS [place] Time Tracking (below)   Total Timed Codes (min):  40 1:1 Treatment Time:  25     TREATMENT AREA =  Left TKA    SUBJECTIVE    Pain Level (on 0 to 10 scale):  0  / 10   Medication Changes/New allergies or changes in medical history, any new surgeries or procedures? NO    If yes, update Summary List   Subjective Functional Status/Changes:  []  No changes reported     \"It is still sore. \"          OBJECTIVE    30 (15) min Therapeutic Exercise:  [x]  See flow sheet   Rationale:      increase ROM and increase strength to improve the patients ability to perform ADL's, gait, and functional mobility with improved joint mechanics and decreased pain. 10 (10) min Manual Therapy: Scar massage, patellar mobs (inf/sup/med/lat), PROM to knee flex/ext. Rationale:      decrease pain, increase ROM and increase tissue extensibility to improve patient's ability to perform ADL's, gait, and functional mobility with improved joint mechanics and decreased pain. min Patient Education:  YES  Reviewed HEP   []  Progressed/Changed HEP based on:   Cont HEP     Other Objective/Functional Measures:    Left knee AROM: 3 - 120 degrees; PROM: 0 - 125 degrees.                                           Strength (1-5)  Hip Left Right   Flexion 5 5   Extension 4 4   Abduction 5 5   Adduction 4 4   ER     IR     Knee Left Right   Extension 5 5   Flexion 4 5     FOTO = 34/100     Post Treatment Pain Level (on 0 to 10) scale:   0  / 10     ASSESSMENT    Assessment/Changes in Function:     See PN to MD     []  See Progress Note/Recertification   Patient will continue to benefit from skilled PT services to modify and progress therapeutic interventions, address functional mobility deficits, address ROM deficits, address strength deficits, analyze and address soft tissue restrictions, analyze and cue movement patterns, analyze and modify body mechanics/ergonomics, assess and modify postural abnormalities and instruct in home and community integration to attain remaining goals.      Progress toward goals / Updated goals:    See PN to MD     PLAN    [x]  Upgrade activities as tolerated YES Continue plan of care   []  Discharge due to :    []  Other:      Therapist: Henry Miller PT    Date: 8/1/2018 Time: 7:43 AM     Future Appointments  Date Time Provider Theron Lam   8/1/2018 8:00 AM Henry Miller PT [facility]   8/6/2018 5:00 PM Henry Miller PT [facility]   8/8/2018 1:00 PM Henry Miller PT [facility]   8/13/2018 3:00 PM Henry Miller PT [facility]   8/17/2018 1:00 PM Henry Miller PT [facility]   8/24/2018 11:00 AM Henry Miller PT [facility]   8/27/2018 5:00 PM Henry Miller PT [facility]   8/29/2018 1:00 PM Henry Miller PT [facility]

## 2018-08-06 ENCOUNTER — HOSPITAL ENCOUNTER (OUTPATIENT)
Dept: PHYSICAL THERAPY | Age: 58
Discharge: HOME | End: 2018-08-06
Payer: COMMERCIAL

## 2018-08-06 PROCEDURE — 97110 THERAPEUTIC EXERCISES: CPT

## 2018-08-06 PROCEDURE — 97140 MANUAL THERAPY 1/> REGIONS: CPT

## 2018-08-06 NOTE — PROGRESS NOTES
PHYSICAL THERAPY - DAILY TREATMENT NOTE    Patient Name: Hansa Roca        Date: 2018  : 1960   YES Patient  Verified  Visit #:   10   of   12  Insurance: Payor: Jyoti Irving / Plan: Sahra Huynh / Product Type: HMO /      In time: 5:05 Out time: 5:39   Total Treatment Time: 34     Medicare/BCBS [place] Time Tracking (below)   Total Timed Codes (min):  34 1:1 Treatment Time:  34     TREATMENT AREA =  Left TKA    SUBJECTIVE    Pain Level (on 0 to 10 scale):  3.5  / 10   Medication Changes/New allergies or changes in medical history, any new surgeries or procedures? NO    If yes, update Summary List   Subjective Functional Status/Changes:  []  No changes reported     \"I saw the PA last week. She sent me back to work. I am working half days this week and 3/4 days next week. \"           OBJECTIVE    24 min Therapeutic Exercise:  [x]  See flow sheet   Rationale:      increase ROM and increase strength to improve the patients ability to perform ADL's, gait, and functional mobility with improved joint mechanics and decreased pain. 10 min Manual Therapy: Patellar mobs; PROM for left knee flexion/extension    Rationale:      decrease pain, increase ROM and increase tissue extensibility to improve patient's ability to perform ADL's, gait, and functional mobility with improved joint mechanics and decreased pain. min Patient Education:  YES  Reviewed HEP   []  Progressed/Changed HEP based on:   Cont HEP     Other Objective/Functional Measures:    Left knee PROM extension to 0 degrees     Post Treatment Pain Level (on 0 to 10) scale:   3  / 10     ASSESSMENT    Assessment/Changes in Function:     Patient reports increased pain with step ups.      []  See Progress Note/Recertification   Patient will continue to benefit from skilled PT services to modify and progress therapeutic interventions, address functional mobility deficits, address ROM deficits, address strength deficits, analyze and address soft tissue restrictions, analyze and cue movement patterns, analyze and modify body mechanics/ergonomics, assess and modify postural abnormalities and instruct in home and community integration to attain remaining goals. Progress toward goals / Updated goals:    New Goals to be achieved in __3-4__  weeks:  1. Patient will be independent with HEP. Compliant with HEP   2. Improve strength of left knee flexion to 5/5 with no c/o pain to increase dynamic stability with gait. 3.  Patient will improve left knee AROM extension to < 0 degrees to facilitate normal gait. Left knee PROM ext to 0 degrees. 4.  Patient will increase FOTO Functional Status score to 54/100 to indicate decreased functional limitations.           PLAN    [x]  Upgrade activities as tolerated YES Continue plan of care   []  Discharge due to :    []  Other:      Therapist: Kirby Lyon PT    Date: 8/6/2018 Time: 5:20 PM     Future Appointments  Date Time Provider Theron Lam   8/8/2018 1:00  [place], PT [facility]   8/13/2018 3:00  [place], PT [facility]   8/17/2018 1:00  [place], PT [facility]   8/24/2018 11:00  [place], PT [facility]   8/27/2018 5:00 PM Kirby Lyon PT [facility]   8/29/2018 1:00 PM Kirby Lyon PT [facility]

## 2018-08-08 ENCOUNTER — HOSPITAL ENCOUNTER (OUTPATIENT)
Dept: PHYSICAL THERAPY | Age: 58
Discharge: HOME | End: 2018-08-08
Payer: COMMERCIAL

## 2018-08-08 PROCEDURE — 97110 THERAPEUTIC EXERCISES: CPT

## 2018-08-08 NOTE — PROGRESS NOTES
PHYSICAL THERAPY - DAILY TREATMENT NOTE    Patient Name: Ela Montano        Date: 2018  : 1960   YES Patient  Verified  Visit #:     Insurance: Payor: Lanec Larios / Plan: Shaun Stone / Product Type: HMO /      In time: 1:00 Out time: 2:15   Total Treatment Time: 75     Medicare/BCBS [place] Time Tracking (below)   Total Timed Codes (min):  75 1:1 Treatment Time:  65     TREATMENT AREA =  Left TKA    SUBJECTIVE    Pain Level (on 0 to 10 scale):  3  / 10   Medication Changes/New allergies or changes in medical history, any new surgeries or procedures? NO    If yes, update Summary List   Subjective Functional Status/Changes:  []  No changes reported     \"My knee feels pretty good today. \"          OBJECTIVE    Modalities Rationale:  decrease edema, decrease inflammation and decrease pain to improve patient's ability to perform ADL's, gait, and functional mobility with improved joint mechanics and decreased pain.      min [] Estim, type/location:                                      []  att     []  unatt     []  w/US     []  w/ice    []  w/heat    min []  Mechanical Traction: type/lbs                   []  pro   []  sup   []  int   []  cont    []  before manual    []  after manual    min []  Ultrasound, settings/location:      min []  Iontophoresis w/ dexamethasone, location:                                               []  take home patch       []  in clinic    min []  Ice     []  Heat    location/position:    10 min [x]  Vasopneumatic Device, press/temp: [employer] to left knee (elevated on wedge), medium compression    min []  Other:    [x] Skin assessment post-treatment (if applicable):    [x]  intact    []  redness- no adverse reaction     []redness - adverse reaction:      55 min Therapeutic Exercise:  [x]  See flow sheet   Rationale:      increase ROM and increase strength to improve the patients ability to perform ADL's, gait, and functional mobility with improved joint mechanics and decreased pain. 10 min Manual Therapy: Patellar mobs; PROM for left knee flexion/extension    Rationale:      decrease pain, increase ROM and increase tissue extensibility to improve patient's ability to perform ADL's, gait, and functional mobility with improved joint mechanics and decreased pain. min Patient Education:  YES  Reviewed HEP   []  Progressed/Changed HEP based on:   Cont HEP     Other Objective/Functional Measures:    Progressed step ups to 6\" step. Post Treatment Pain Level (on 0 to 10) scale:   1  / 10     ASSESSMENT    Assessment/Changes in Function:     Tolerated exercises without c/o's.     []  See Progress Note/Recertification   Patient will continue to benefit from skilled PT services to modify and progress therapeutic interventions, address functional mobility deficits, address ROM deficits, address strength deficits, analyze and address soft tissue restrictions, analyze and cue movement patterns, analyze and modify body mechanics/ergonomics, assess and modify postural abnormalities and instruct in home and community integration to attain remaining goals. Progress toward goals / Updated goals:    New Goals to be achieved in __3-4__  weeks:  1.  Patient will be independent with HEP. Compliant with HEP   2.  Improve strength of left knee flexion to 5/5 with no c/o pain to increase dynamic stability with gait. Cont LE strengthening exercises. 3.  Patient will improve left knee AROM extension to < 0 degrees to facilitate normal gait. 4.  Patient will increase FOTO Functional Status score to 54/100 to indicate decreased functional limitations.           PLAN    [x]  Upgrade activities as tolerated YES Continue plan of care   []  Discharge due to :    []  Other:      Therapist: Diego Winn PT    Date: 8/8/2018 Time: 2:15 PM     Future Appointments  Date Time Provider Theron Lam   8/13/2018 3:00 PM Diego Winn PT [facility]   8/17/2018 1:00 PM Irvin Mai PT [facility]   8/24/2018 11:00 AM Irvin Mai PT [facility]   8/27/2018 5:00 PM Irvin Mai PT [facility]   8/29/2018 1:00 PM Irvin Mai PT [facility]

## 2018-08-17 ENCOUNTER — HOSPITAL ENCOUNTER (OUTPATIENT)
Dept: PHYSICAL THERAPY | Age: 58
Discharge: HOME | End: 2018-08-17
Payer: COMMERCIAL

## 2018-08-17 PROCEDURE — 97110 THERAPEUTIC EXERCISES: CPT

## 2018-08-17 NOTE — PROGRESS NOTES
PHYSICAL THERAPY - DAILY TREATMENT NOTE    Patient Name: Loy Leventhal        Date: 2018  : 1960   YES Patient  Verified  Visit #:     Insurance: Payor: Maikel Rivera / Plan: Sonia Else / Product Type: HMO /      In time: 1:00 Out time: 1:50   Total Treatment Time: 50     Medicare/BCBS [place] Time Tracking (below)   Total Timed Codes (min):  NA 1:1 Treatment Time:  NA     TREATMENT AREA =  Left TKA    SUBJECTIVE    Pain Level (on 0 to 10 scale):  0.5  / 10   Medication Changes/New allergies or changes in medical history, any new surgeries or procedures? NO    If yes, update Summary List   Subjective Functional Status/Changes:  []  No changes reported     Reports he starts full days at work next week. OBJECTIVE    50 min Therapeutic Exercise:  [x]  See flow sheet   Rationale:      increase ROM and increase strength to improve the patients ability to perform ADL's, gait, and functional mobility with improved joint mechanics and decreased pain. min Patient Education:  YES  Reviewed HEP   []  Progressed/Changed HEP based on:   Cont HEP     Other Objective/Functional Measures:    Left knee AROM: 0 - 120 deg  Left hip strength:  Flex: 5/5  Abd: 5/5  Ext: 5/5     Post Treatment Pain Level (on 0 to 10) scale:   0  / 10     ASSESSMENT    Assessment/Changes in Function:     Tolerated exercises without c/o's.     []  See Progress Note/Recertification   Patient will continue to benefit from skilled PT services to modify and progress therapeutic interventions, address functional mobility deficits, address ROM deficits, address strength deficits, analyze and address soft tissue restrictions, analyze and cue movement patterns, analyze and modify body mechanics/ergonomics, assess and modify postural abnormalities and instruct in home and community integration to attain remaining goals.      Progress toward goals / Updated goals:    New Goals to be achieved in __3-4__  weeks:  1.  Patient will be independent with HEP. Compliant with HEP   2.  Improve strength of left knee flexion to 5/5 with no c/o pain to increase dynamic stability with gait. Cont LE strengthening exercises. 3.  Patient will improve left knee AROM extension to < 0 degrees to facilitate normal gait. 4.  Patient will increase FOTO Functional Status score to 54/100 to indicate decreased functional limitations. PLAN    []  Upgrade activities as tolerated YES Continue plan of care   []  Discharge due to :    [x]  Other: Cont PT X 2 visits to ensure no problems with left knee with return to full work days.      Therapist: Valiant Sicard, PT    Date: 8/17/2018 Time: 7:55 AM     Future Appointments  Date Time Provider Theron Lam   8/17/2018 1:00 PM Valiant Sicard, PT [facility]   8/24/2018 11:00 AM Valiant Sicard, PT [facility]   8/27/2018 5:00 PM Valiant Sicard, PT [facility]   8/29/2018 1:00 PM Valiant Sicard, PT [facility]

## 2018-08-24 ENCOUNTER — APPOINTMENT (OUTPATIENT)
Dept: PHYSICAL THERAPY | Age: 58
End: 2018-08-24
Payer: COMMERCIAL

## 2018-08-28 NOTE — PROGRESS NOTES
[address] PHYSICAL THERAPY  [address] [place] [address] Qian Santana, [address] - Phone: [phone]  Fax: [phone] SUMMARY  Patient Name: Kaela Ackerman : 1960   Treatment/Medical Diagnosis: Left knee pain [M25.562]   Referral Source: Mallorie Lares     Date of Initial Visit: 2018 Attended Visits: 12 Missed Visits: 2     SUMMARY OF TREATMENT  Treatment consisted of patient education, ROM and strengthening exercises for his left LE. Manual treatment of patellar mobs, ant/post tibial glides and PROM for flexion and extension of left knee was also performed as was cold packs and vasopneumatic compression for pain control. Patient was instructed in a home exercise program to continue independently. CURRENT STATUS  Left knee AROM: 0 -120 degrees. Strength with left hip flex, ext, and abduction is 5/5. Patient has returned to work and is tolerating work duties without significantly increased pain. Patient cancelled his last 2 PT appointments, reporting his knee was \"doing well\" and did not need any more PT. Therefore, final FOTO (Focused on Therapeutic Outcomes) score could not be obtained. Goal/Measure of Progress Goal Met? 1. Patient will be independent with HEP. Status at last Eval: Compliant with HEP Current Status: independent with HEP yes   2. Improve strength of left knee flexion to 5/5 with no c/o pain to increase dynamic stability with gait. Status at last Eval: 4/5 Current Status: NT n/a   3. Patient will improve left knee AROM extension to < 0 degrees to facilitate normal gait. Status at last Eval: 3 degrees Current Status: 0 degrees yes   4. Patient will increase FOTO Functional Status score to 54/100 to indicate decreased functional limitations. Status at last Eval: 32/100 Current Status: NT n/a     G-Codes: NA  RECOMMENDATIONS  Discontinue therapy. Progressing towards or have reached established goals.     If you have any questions/comments please contact us directly at [phone]. Thank you for allowing us to assist in the care of your patient.     Therapist Signature: Kasey Radford, MICHAEL Date: 8/28/2018     Time: 1:25 PM

## 2018-11-08 PROBLEM — R73.9 HYPERGLYCEMIA: Status: ACTIVE | Noted: 2018-11-08

## 2018-11-08 PROBLEM — R07.9 CHEST PAIN: Status: ACTIVE | Noted: 2018-11-08

## 2020-04-03 ENCOUNTER — HOSPITAL ENCOUNTER (EMERGENCY)
Age: 60
Discharge: HOME | End: 2020-04-03
Attending: EMERGENCY MEDICINE
Payer: SELF-PAY

## 2020-04-03 VITALS
WEIGHT: 211 LBS | TEMPERATURE: 98 F | RESPIRATION RATE: 16 BRPM | BODY MASS INDEX: 28.58 KG/M2 | OXYGEN SATURATION: 100 % | SYSTOLIC BLOOD PRESSURE: 140 MMHG | HEIGHT: 72 IN | HEART RATE: 100 BPM | DIASTOLIC BLOOD PRESSURE: 82 MMHG

## 2020-04-03 DIAGNOSIS — L02.91 ABSCESS: Primary | ICD-10-CM

## 2020-04-03 PROCEDURE — 99282 EMERGENCY DEPT VISIT SF MDM: CPT

## 2020-04-03 PROCEDURE — 75810000289 HC I&D ABSCESS SIMP/COMP/MULT

## 2020-04-03 RX ORDER — CEPHALEXIN 500 MG/1
500 CAPSULE ORAL 4 TIMES DAILY
Qty: 40 CAP | Refills: 0 | Status: SHIPPED | OUTPATIENT
Start: 2020-04-03 | End: 2020-04-13

## 2020-04-03 RX ORDER — IBUPROFEN 800 MG/1
800 TABLET ORAL EVERY 8 HOURS
Qty: 15 TAB | Refills: 0 | Status: SHIPPED | OUTPATIENT
Start: 2020-04-03 | End: 2020-04-08

## 2020-04-03 RX ORDER — SULFAMETHOXAZOLE AND TRIMETHOPRIM 800; 160 MG/1; MG/1
2 TABLET ORAL 2 TIMES DAILY
Qty: 40 TAB | Refills: 0 | Status: SHIPPED | OUTPATIENT
Start: 2020-04-03 | End: 2020-04-13

## 2020-04-03 NOTE — DISCHARGE INSTRUCTIONS

## 2020-04-03 NOTE — ED PROVIDER NOTES
EMERGENCY DEPARTMENT HISTORY AND PHYSICAL EXAM    Date: 4/3/2020  Patient Name: ySdni Phillips    History of Presenting Illness     Chief Complaint   Patient presents with    Abscess         History Provided By: patient        Additional History (Context): Sydni Phillips is a 58-year-old male with history of diabetes, MRSA, hepatitis C, hypertension presenting to the emergency department with complaint of abscess to buttock. Patient states he is noticed gradually worsening abscess for the past week. Reports drainage of pus and blood that started yesterday. Has been attempting to do warm soaks with no relief. Denies fever, chills, history of IV drug use. Patient states he is compliant with diabetic medication. No other complaints at this time    PCP: Margot Argueta MD    Current Outpatient Medications   Medication Sig Dispense Refill    trimethoprim-sulfamethoxazole (Bactrim DS) 160-800 mg per tablet Take 2 Tabs by mouth two (2) times a day for 10 days. 40 Tab 0    cephALEXin (Keflex) 500 mg capsule Take 1 Cap by mouth four (4) times daily for 10 days. 40 Cap 0    ibuprofen (MOTRIN) 800 mg tablet Take 1 Tab by mouth every eight (8) hours for 5 days. 15 Tab 0    metFORMIN (GLUCOPHAGE) 1,000 mg tablet Take 1 Tab by mouth two (2) times daily (with meals). 60 Tab 0    lisinopril (PRINIVIL, ZESTRIL) 20 mg tablet Take 1.5 Tabs by mouth daily. 30 Tab 0    albuterol (PROAIR HFA) 90 mcg/actuation inhaler Take 2 Puffs by inhalation every four (4) hours as needed for Wheezing.  1 Inhaler 0       Past History     Past Medical History:  Past Medical History:   Diagnosis Date    Degenerative joint disease     Diabetes mellitus ([address]     Hepatitis C     Hyperlipidemia     Hypertension     Osteoarthritis        Past Surgical History:  Past Surgical History:   Procedure Laterality Date    HX APPENDECTOMY      HX CARPAL TUNNEL RELEASE      HX KNEE ARTHROSCOPY      HX KNEE REPLACEMENT Left 2018 approximatelly 5/2018       Family History:  History reviewed. No pertinent family history. Social History:  Social History     Tobacco Use    Smoking status: Current Every Day Smoker     Packs/day: 1.00     Years: 50.00     Pack years: 50.00     Types: Cigarettes    Smokeless tobacco: Never Used   Substance Use Topics    Alcohol use: No     Frequency: Never    Drug use: Yes     Comment: cocaine use >30 years ago        Allergies: Allergies   Allergen Reactions    Latex Rash    Codeine Itching    Statins-Hmg-Coa Reductase Inhibitors Other (comments)     Pain with stiffness of joint         Review of Systems       Review of Systems   Constitutional: Negative for chills and fever. HENT: Negative for nasal congestion, sore throat, rhinorrhea  Eyes: Negative. Respiratory: Negative for cough and negative for shortness of breath. Cardiovascular: Negative for chest pain and palpitations. Gastrointestinal: Negative for abdominal pain, constipation, diarrhea, nausea and vomiting. Genitourinary: Negative. Negative for difficulty urinating and flank pain. Musculoskeletal: Negative for back pain. Negative for gait problem and neck pain. Skin: Positive for abscess. Allergic/Immunologic: Negative. Neurological: Negative for dizziness, weakness, numbness and headaches. Psychiatric/Behavioral: Negative. All other systems reviewed and are negative. All Other Systems Negative  Physical Exam     Vitals:    04/03/20 1005   BP: 140/82   Pulse: 100   Resp: 16   Temp: 98 °F (36.7 °C)   SpO2: 100%   Weight: 95.7 kg (211 lb)   Height: 6' (1.829 m)     Physical Exam  Vitals signs and nursing note reviewed. Constitutional:       General: He is not in acute distress. Appearance: He is well-developed. He is not diaphoretic. HENT:      Head: Normocephalic and atraumatic.       Nose: Nose normal.   Eyes:      Conjunctiva/sclera: Conjunctivae normal.      Pupils: Pupils are equal, round, and reactive to light. Neck:      Musculoskeletal: Normal range of motion and neck supple. Cardiovascular:      Rate and Rhythm: Normal rate and regular rhythm. Pulmonary:      Effort: Pulmonary effort is normal. No respiratory distress. Breath sounds: Normal breath sounds. Abdominal:      Palpations: Abdomen is soft. Musculoskeletal: Normal range of motion. Skin:     General: Skin is warm. Findings: No rash. Neurological:      Mental Status: He is alert and oriented to person, place, and time. Cranial Nerves: No cranial nerve deficit. Coordination: Coordination normal.   Psychiatric:         Behavior: Behavior normal.           Diagnostic Study Results     Labs -   No results found for this or any previous visit (from the past 12 hour(s)). Radiologic Studies -   No orders to display     CT Results  (Last 48 hours)    None        CXR Results  (Last 48 hours)    None            Medical Decision Making   I am the first provider for this patient. I reviewed the vital signs, available nursing notes, past medical history, past surgical history, family history and social history. Vital Signs-Reviewed the patient's vital signs. Records Reviewed: Nursing notes, old medical records and any previous labs, imaging, visits, consultations pertinent to patient care    Procedures:  I&D Abcess Simple  Date/Time: 4/3/2020 10:55 AM  Performed by: Roberto Guerra PA-C  Authorized by: Roberto Guerra PA-C     Consent:     Consent obtained:  Verbal    Consent given by:  Patient    Risks discussed:  Incomplete drainage, bleeding and pain    Alternatives discussed:  Delayed treatment and referral  Location:     Type:  Abscess    Location:  Anogenital    Anogenital location:  Gluteal cleft  Pre-procedure details:     Skin preparation:  Betadine  Anesthesia (see MAR for exact dosages):      Anesthesia method:  Local infiltration    Local anesthetic:  Lidocaine 1% w/o epi  Procedure type: Complexity:  Simple  Procedure details:     Needle aspiration: no      Incision types:  Stab incision    Incision depth:  Subcutaneous    Scalpel blade:  11    Wound management:  Probed and deloculated    Drainage:  Bloody and purulent    Drainage amount: Moderate    Wound treatment:  Wound left open    Packing materials:  None  Post-procedure details:     Patient tolerance of procedure: Tolerated well, no immediate complications          ED Course: Progress Notes, Reevaluation, and Consults:      Provider Notes (Medical Decision Making):   Pt here with induration to buttock, c/w abscess. Area started spontaneously draining, I performed I&D to help facilitate drainage. Will Rx bactrim and keflex and give f/u instructions. MED RECONCILIATION:  No current facility-administered medications for this encounter. Current Outpatient Medications   Medication Sig    trimethoprim-sulfamethoxazole (Bactrim DS) 160-800 mg per tablet Take 2 Tabs by mouth two (2) times a day for 10 days.  cephALEXin (Keflex) 500 mg capsule Take 1 Cap by mouth four (4) times daily for 10 days.  ibuprofen (MOTRIN) 800 mg tablet Take 1 Tab by mouth every eight (8) hours for 5 days.  metFORMIN (GLUCOPHAGE) 1,000 mg tablet Take 1 Tab by mouth two (2) times daily (with meals).  lisinopril (PRINIVIL, ZESTRIL) 20 mg tablet Take 1.5 Tabs by mouth daily.  albuterol (PROAIR HFA) 90 mcg/actuation inhaler Take 2 Puffs by inhalation every four (4) hours as needed for Wheezing. Disposition:  home    DISCHARGE NOTE:     Pt has been reexamined. Patient has no new complaints, changes, or physical findings. Care plan outlined and precautions discussed. Discussed proper way to take medications. Discussed treatment plan, return precautions, symptomatic relief, and expected time to improvement. All questions answered. Patient is stable for discharge and outpatient management. Patient is ready to go home.     Follow-up Information Follow up With Specialties Details Why [address] [facility] EMERGENCY DEPT Emergency Medicine   [address]  [phone]    Chan Cote MD [facility]   [address] [facility] [address]  [place] [phone]            Current Discharge Medication List      START taking these medications    Details   trimethoprim-sulfamethoxazole (Bactrim DS) 160-800 mg per tablet Take 2 Tabs by mouth two (2) times a day for 10 days. Qty: 40 Tab, Refills: 0      cephALEXin (Keflex) 500 mg capsule Take 1 Cap by mouth four (4) times daily for 10 days. Qty: 40 Cap, Refills: 0      ibuprofen (MOTRIN) 800 mg tablet Take 1 Tab by mouth every eight (8) hours for 5 days. Qty: 15 Tab, Refills: 0                   Diagnosis     Clinical Impression:   1. Abscess            Dictation disclaimer:  Please note that this dictation was completed with [employer], the computer voice recognition software. Quite often unanticipated grammatical, syntax, homophones, and other interpretive errors are inadvertently transcribed by the computer software. Please disregard these errors. Please excuse any errors that have escaped final proofreading.

## 2020-07-03 ENCOUNTER — HOSPITAL ENCOUNTER (EMERGENCY)
Age: 60
Discharge: HOME | End: 2020-07-03
Attending: EMERGENCY MEDICINE
Payer: SELF-PAY

## 2020-07-03 VITALS
WEIGHT: 207 LBS | RESPIRATION RATE: 16 BRPM | OXYGEN SATURATION: 99 % | TEMPERATURE: 97.7 F | HEART RATE: 83 BPM | SYSTOLIC BLOOD PRESSURE: 143 MMHG | BODY MASS INDEX: 28.04 KG/M2 | DIASTOLIC BLOOD PRESSURE: 73 MMHG | HEIGHT: 72 IN

## 2020-07-03 DIAGNOSIS — L03.211 FACIAL CELLULITIS: Primary | ICD-10-CM

## 2020-07-03 PROCEDURE — 99283 EMERGENCY DEPT VISIT LOW MDM: CPT

## 2020-07-03 RX ORDER — SULFAMETHOXAZOLE AND TRIMETHOPRIM 800; 160 MG/1; MG/1
1 TABLET ORAL 2 TIMES DAILY
Qty: 14 TAB | Refills: 0 | Status: SHIPPED | OUTPATIENT
Start: 2020-07-03 | End: 2020-07-10

## 2020-07-03 RX ORDER — ALBUTEROL SULFATE 90 UG/1
2 AEROSOL, METERED RESPIRATORY (INHALATION)
Qty: 1 INHALER | Refills: 0 | Status: SHIPPED | OUTPATIENT
Start: 2020-07-03

## 2020-07-03 RX ORDER — CEPHALEXIN 500 MG/1
500 CAPSULE ORAL 4 TIMES DAILY
Qty: 28 CAP | Refills: 0 | Status: SHIPPED | OUTPATIENT
Start: 2020-07-03 | End: 2020-07-10

## 2020-07-03 RX ORDER — NAPROXEN 500 MG/1
500 TABLET ORAL 2 TIMES DAILY WITH MEALS
Qty: 20 TAB | Refills: 0 | Status: SHIPPED | OUTPATIENT
Start: 2020-07-03 | End: 2020-07-13

## 2020-07-03 NOTE — ED PROVIDER NOTES
[address] [facility]  EMERGENCY DEPARTMENT HISTORY AND PHYSICAL EXAM       Date: 7/3/2020   Patient Name: Verna Mendosa   YOB: 1960  Medical Record Number: [id number]    HISTORY OF PRESENTING ILLNESS:     Verna Mendosa is a 56 y.o. male presenting with the noted PMH to the ED c/o infection. Patient states he is got an infection in the right side of his nose into his face area. He states it started 3 days ago. Progressively getting worse. Painful to touch. No decreasing factors. Denies any fevers or chills. Denies any sore throat. He states he does have some nasal congestion. He states he does occasionally have a cough from being a smoker's cough. Unchanged. He states he has run out of his albuterol inhaler at home and requesting a prescription. Denies any chest pain or shortness of breath. Denies abdominal pain. Denies any urine bowel changes. As of systems reviewed and negative. He states he has had this before and was given prescription for Bactrim and Keflex in the way. Primary Care Provider: Daniella Cooney MD   Specialist:    Past Medical History:   Past Medical History:   Diagnosis Date    Degenerative joint disease     Diabetes mellitus ([address]     Hepatitis C     Hyperlipidemia     Hypertension     Osteoarthritis         Past Surgical History:   Past Surgical History:   Procedure Laterality Date    HX APPENDECTOMY      HX CARPAL TUNNEL RELEASE      HX KNEE ARTHROSCOPY      HX KNEE REPLACEMENT Left 2018    approximatelly 5/2018        Social History:   Social History     Tobacco Use    Smoking status: Current Every Day Smoker     Packs/day: 1.00     Years: 50.00     Pack years: 50.00     Types: Cigarettes    Smokeless tobacco: Never Used   Substance Use Topics    Alcohol use: No     Frequency: Never    Drug use: Yes     Comment: cocaine use >30 years ago         Allergies:    Allergies   Allergen Reactions    Latex Rash    Codeine Itching    Statins-Hmg-Coa Reductase Inhibitors Other (comments)     Pain with stiffness of joint        REVIEW OF SYSTEMS:  Review of Systems      PHYSICAL EXAM:  Vitals:    07/03/20 1215 07/03/20 1230 07/03/20 1245 07/03/20 1259   BP: 143/73 142/76 143/73    Pulse: 83      Resp: 16      Temp: 97.7 °F (36.5 °C)      SpO2: 97% 98% 99% 99%   Weight: 93.9 kg (207 lb)      Height: 6' (1.829 m)          Physical Exam   Vital signs reviewed. Alert oriented x 3 in NAD. HEENT: normocephalic atraumatic. Eyes are PERRLA EOMI. Conjunctiva normal.    External ears and nose normal.    Right side of nasolabial crease to right cheek with some erythema. No fluctuance. No septal hematoma. No stridor. Nares patent. Neck: normal external exam. No midline neck or back TTP. Lungs are clear to ascultation bilaterally. normal effort  Heart is regular rate and rhythm with no murmurs. Abdomen soft and nontender. No rebound rigidity or guarding. Extremities: Moves all 4 extremities and no distress. Full range of motion. 2+ pulses and BCR in all 4 extremities. Neuro: Normal gait. 5 out of 5 strength in all 4 extremities. No facial droop. Skin examination: intact. no rashes. No petechia or purpura. Medications - No data to display    RESULTS:    Labs -   Labs Reviewed - No data to display    Radiologic Studies -  No results found. MEDICAL DECISION MAKING    Patient states he is history of boils. However does not seem to have any appreciated boil this time. Seems to be more cellulitis. Will go ahead and cover with antibiotics and give him a refill of his albuterol. Discussed with him to come back if symptoms change or worsen. He states understanding and agrees with plan. No signs of sepsis. No evidence of Perico's angina or peritonsillar abscess. Patient tolerating p.o. Results and precautions explained. Patient has no new complaints, changes, or physical findings.    Results were reviewed with the patient. Pt's questions and concerns were addressed. Care plan was outlined, including follow-up with PCP/specialist and return precautions were discussed. Patient is felt to be stable for discharge at this time. Diagnosis   Clinical Impression:   1. Facial cellulitis           Follow-up Information     Follow up With Specialties Details Why Contact Info    Cristhian Nicolas MD [facility] Call today  [phone] [facility] EMERGENCY DEPT Emergency Medicine Go in 2 days If symptoms worsen [address]  [phone]    [facility] Department of Otolaryngology  Call today  [address] [facility]  [phone]          Current Discharge Medication List      START taking these medications    Details   trimethoprim-sulfamethoxazole (Bactrim DS) 160-800 mg per tablet Take 1 Tab by mouth two (2) times a day for 7 days. Qty: 14 Tab, Refills: 0      cephALEXin (Keflex) 500 mg capsule Take 1 Cap by mouth four (4) times daily for 7 days. Qty: 28 Cap, Refills: 0      naproxen (Naprosyn) 500 mg tablet Take 1 Tab by mouth two (2) times daily (with meals) for 10 days. As needed for pain  Qty: 20 Tab, Refills: 0      !! albuterol (Ventolin HFA) 90 mcg/actuation inhaler Take 2 Puffs by inhalation every four (4) hours as needed for Wheezing. Qty: 1 Inhaler, Refills: 0       !! - Potential duplicate medications found. Please discuss with provider. CONTINUE these medications which have NOT CHANGED    Details   metFORMIN (GLUCOPHAGE) 1,000 mg tablet Take 1 Tab by mouth two (2) times daily (with meals). Qty: 60 Tab, Refills: 0      lisinopril (PRINIVIL, ZESTRIL) 20 mg tablet Take 1.5 Tabs by mouth daily. Qty: 30 Tab, Refills: 0      !! albuterol (PROAIR HFA) 90 mcg/actuation inhaler Take 2 Puffs by inhalation every four (4) hours as needed for Wheezing.   Qty: 1 Inhaler, Refills: 0       !! - Potential duplicate medications found. Please discuss with provider. Discharged in stable and improved condition. This chart was completed using Dragon, a dictation transcription service. Errors may have resulted from using this device.

## 2020-07-03 NOTE — DISCHARGE INSTRUCTIONS
Thank you so much for visiting us today. Please make sure to call your doctor tomorrow to be rechecked in 1-3 days. Bring your results from here with you when you do. Return immediately if any fevers, headaches, chest pain, difficulty breathing, abdominal pain, worsening or changing symptoms, or any other concerns. Patient Education        Cellulitis: Care Instructions  Your Care Instructions     Cellulitis is a skin infection caused by bacteria, most often strep or staph. It often occurs after a break in the skin from a scrape, cut, bite, or puncture, or after a rash. Cellulitis may be treated without doing tests to find out what caused it. But your doctor may do tests, if needed, to look for a specific bacteria, like methicillin-resistant Staphylococcus aureus (MRSA). The doctor has checked you carefully, but problems can develop later. If you notice any problems or new symptoms, get medical treatment right away. Follow-up care is a key part of your treatment and safety. Be sure to make and go to all appointments, and call your doctor if you are having problems. It's also a good idea to know your test results and keep a list of the medicines you take. How can you care for yourself at home? · Take your antibiotics as directed. Do not stop taking them just because you feel better. You need to take the full course of antibiotics. · Prop up the infected area on pillows to reduce pain and swelling. Try to keep the area above the level of your heart as often as you can. · If your doctor told you how to care for your wound, follow your doctor's instructions. If you did not get instructions, follow this general advice:  ? Wash the wound with clean water 2 times a day. Don't use hydrogen peroxide or alcohol, which can slow healing. ? You may cover the wound with a thin layer of petroleum jelly, such as Vaseline, and a nonstick bandage. ? Apply more petroleum jelly and replace the bandage as needed.   · Be safe with medicines. Take pain medicines exactly as directed. ? If the doctor gave you a prescription medicine for pain, take it as prescribed. ? If you are not taking a prescription pain medicine, ask your doctor if you can take an over-the-counter medicine. To prevent cellulitis in the future  · Try to prevent cuts, scrapes, or other injuries to your skin. Cellulitis most often occurs where there is a break in the skin. · If you get a scrape, cut, mild burn, or bite, wash the wound with clean water as soon as you can to help avoid infection. Don't use hydrogen peroxide or alcohol, which can slow healing. · If you have swelling in your legs (edema), support stockings and good skin care may help prevent leg sores and cellulitis. · Take care of your feet, especially if you have diabetes or other conditions that increase the risk of infection. Wear shoes and socks. Do not go barefoot. If you have athlete's foot or other skin problems on your feet, talk to your doctor about how to treat them. When should you call for help? Call your doctor now or seek immediate medical care if:  · You have signs that your infection is getting worse, such as:  ? Increased pain, swelling, warmth, or redness. ? Red streaks leading from the area. ? Pus draining from the area. ? A fever. · You get a rash. Watch closely for changes in your health, and be sure to contact your doctor if:  · You do not get better as expected. Where can you learn more? Go to http://nima-mariza.info/  Enter X309 in the search box to learn more about \"Cellulitis: Care Instructions. \"  Current as of: October 31, 2019               Content Version: 12.5  © 0234-1799 [employer]. Care instructions adapted under license by [employer] (which disclaims liability or warranty for this information).  If you have questions about a medical condition or this instruction, always ask your healthcare professional. [employer] disclaims any warranty or liability for your use of this information.

## 2020-07-03 NOTE — ED TRIAGE NOTES
Patient states he has a sore nose, red, swollen, Mass of sores\" and headache x one week    Patient appears confused, states he does not remember arriving to the ed